# Patient Record
(demographics unavailable — no encounter records)

---

## 2025-03-17 NOTE — HISTORY OF PRESENT ILLNESS
[de-identified] : Initial visit for this communicative and reliable patient. Reports that he was in his otherwise state of fine health when last week at the gym he bent over and developed vertigo.  This lasted for a day or so and at this point is essentially resolved. No history of chronic ear problems.

## 2025-03-17 NOTE — ASSESSMENT
[FreeTextEntry1] : the audiogram was ordered by me and interpreted by me today and the results are as follows- Symmetric pure-tone hearing with some age-appropriate high-frequency loss, normal speech discrimination scores and tympanograms. He is describing BPPV. He is largely improved. Provided him with patient education material on the pathophysiology of the problem. Asked and avoid fast and abrupt head movements. Did give him some home exercises if the problem persist or progress.

## 2025-04-25 NOTE — ASSESSMENT
[FreeTextEntry1] : 77 y/o M w dx of RA =pain, stiffness, swelling PIP, wrists, elbows, knees =labs 6/24 RF 80s; CCP, ESR, CRP negative =xrays 6/24  hands/wrists: OA  Counseled pt needs to see optho regulary to avoid plaquenil retino-toxicity.   Plan  Labs to measure disease activity  hcq 200mg/400mg alternating days f/u optho  RTO in 6 months

## 2025-04-25 NOTE — PHYSICAL EXAM
[General Appearance - Well Nourished] : well nourished [General Appearance - Well Developed] : well developed [Sclera] : the sclera and conjunctiva were normal [Auscultation Breath Sounds / Voice Sounds] : lungs were clear to auscultation bilaterally [Heart Rate And Rhythm] : heart rate was normal and rhythm regular [Heart Sounds] : normal S1 and S2 [Murmurs] : no murmurs [Cervical Lymph Nodes Enlarged Posterior Bilaterally] : posterior cervical [Cervical Lymph Nodes Enlarged Anterior Bilaterally] : anterior cervical [Supraclavicular Lymph Nodes Enlarged Bilaterally] : supraclavicular [Musculoskeletal - Swelling] : no joint swelling seen [] : no rash [Skin Lesions] : no skin lesions [Oriented To Time, Place, And Person] : oriented to person, place, and time

## 2025-04-25 NOTE — DATA REVIEWED
[FreeTextEntry1] : labs reviewed from 6/24 RF 83 WBC 2.62 RF 83 ESR, CRP wnl   xrays 6/24  hands/wrists: OA

## 2025-04-25 NOTE — HISTORY OF PRESENT ILLNESS
[___ Month(s) Ago] : [unfilled] month(s) ago [FreeTextEntry1] : =pt hcq 200mg/400mg alternating days =pt gets less joint pain, stiffness, swelling  =pt to see optho next month =no fevers, SOB, CP, abdominal pain, n/v/d, rashes